# Patient Record
Sex: MALE | Race: WHITE | NOT HISPANIC OR LATINO
[De-identification: names, ages, dates, MRNs, and addresses within clinical notes are randomized per-mention and may not be internally consistent; named-entity substitution may affect disease eponyms.]

---

## 2019-10-16 PROBLEM — Z00.00 ENCOUNTER FOR PREVENTIVE HEALTH EXAMINATION: Status: ACTIVE | Noted: 2019-10-16

## 2019-12-04 ENCOUNTER — APPOINTMENT (OUTPATIENT)
Dept: GASTROENTEROLOGY | Facility: CLINIC | Age: 69
End: 2019-12-04

## 2022-08-16 ENCOUNTER — NON-APPOINTMENT (OUTPATIENT)
Age: 72
End: 2022-08-16

## 2022-08-16 ENCOUNTER — APPOINTMENT (OUTPATIENT)
Dept: DERMATOLOGY | Facility: CLINIC | Age: 72
End: 2022-08-16

## 2022-08-16 DIAGNOSIS — Z12.83 ENCOUNTER FOR SCREENING FOR MALIGNANT NEOPLASM OF SKIN: ICD-10-CM

## 2022-08-16 PROCEDURE — 99203 OFFICE O/P NEW LOW 30 MIN: CPT | Mod: 25

## 2022-08-16 PROCEDURE — 15789 CHEMICAL PEEL FACIAL DERMAL: CPT

## 2022-08-16 NOTE — PHYSICAL EXAM
[FreeTextEntry3] : AAOx3, pleasant, NAD, no visual lymphadenopathy\par hair, scalp, face, nose, eyelids, ears, lips, oropharynx, neck, chest, abdomen, back, right arm, left arm, nails, and hands examined with all normal findings,\par pertinent findings include:\par \par multiple benign nevi and lentigines\par scaling erythematous papules on scalp x18

## 2022-08-16 NOTE — HISTORY OF PRESENT ILLNESS
[FreeTextEntry1] : skin check [de-identified] : 72 year old male here for skin check. hx of numerous AKs and cryo and NMSC.

## 2022-08-16 NOTE — ASSESSMENT
[FreeTextEntry1] : 1) benign findings as above- education\par \par 2) -Consent obtained, \par - No history of HSV. Valtrex unnecessary\par - Area prepped with alcohol\par - Two passes of Jessner's solution with an achieved level 1-2 frost, Pt \par tolerated well\par - Subsequently single pass with TCA 35% with confluent level 2 frost.\par Discrete actinic keratosesx3 with point application of TCA 35% with level 3 \par frost\par - Post peel area cleaned with guaze and cold water.  Vaseline applied to scalp\par - Sun protection emphasized. SPF ordered.  Pt will apply vaseline qdaily to \par area.\par - Post peel care discussed\par \par

## 2023-02-13 ENCOUNTER — APPOINTMENT (OUTPATIENT)
Dept: DERMATOLOGY | Facility: CLINIC | Age: 73
End: 2023-02-13
Payer: MEDICARE

## 2023-02-13 PROCEDURE — 15793 CHEMICAL PEEL NONFACIAL DRM: CPT

## 2023-02-13 PROCEDURE — 99214 OFFICE O/P EST MOD 30 MIN: CPT | Mod: 25

## 2023-02-13 RX ORDER — TRIAMCINOLONE ACETONIDE 1 MG/G
0.1 OINTMENT TOPICAL TWICE DAILY
Qty: 1 | Refills: 1 | Status: ACTIVE | COMMUNITY
Start: 2023-02-13 | End: 1900-01-01

## 2023-02-13 NOTE — HISTORY OF PRESENT ILLNESS
[FreeTextEntry1] : growths [de-identified] : 73 year old male here for skin check. hx of numerous AKs and cryo and NMSC. 2 larger discrete growths on scalp. itching on left upper back.

## 2023-02-13 NOTE — ASSESSMENT
[FreeTextEntry1] : 1) benign findings as above- education\par \par 2) impractical for LN2 to all lesions, unable to tolerate 5Fu or PDT\par \par -Consent obtained, \par - No history of HSV. Valtrex unnecessary\par - Area prepped with alcohol\par - Two passes of Jessner's solution with an achieved level 1-2 frost, Pt \par tolerated well\par - Subsequently single pass with TCA 35% with confluent level 2 frost.\par Discrete actinic keratosesx3 with point application of TCA 35% with level 3 \par frost\par - Post peel area cleaned with guaze and cold water.  Vaseline applied to scalp\par - Sun protection emphasized. SPF ordered.  Pt will apply vaseline qdaily to \par area.\par - Post peel care discussed\par \par Ln2 to 2 discrete areas\par The specified lesions were treated with liquid nitrogen cryotherapy.  Discussed risks including pain, blistering, crusting, discoloration, recurrence.\par \par 3) rash\par TAC BID PRN; SED

## 2023-02-13 NOTE — PHYSICAL EXAM
[FreeTextEntry3] : AAOx3, pleasant, NAD, no visual lymphadenopathy\par hair, scalp, face, nose, eyelids, ears, lips, oropharynx, neck, chest, abdomen, back, right arm, left arm, nails, and hands examined with all normal findings,\par pertinent findings include:\par \par multiple benign nevi and lentigines\par scaling erythematous papules on scalp x16, 2 larger lesions\par red plaque on left upper back

## 2023-08-14 ENCOUNTER — APPOINTMENT (OUTPATIENT)
Dept: DERMATOLOGY | Facility: CLINIC | Age: 73
End: 2023-08-14
Payer: MEDICARE

## 2023-08-14 DIAGNOSIS — D22.9 MELANOCYTIC NEVI, UNSPECIFIED: ICD-10-CM

## 2023-08-14 DIAGNOSIS — L29.9 PRURITUS, UNSPECIFIED: ICD-10-CM

## 2023-08-14 DIAGNOSIS — L81.4 OTHER MELANIN HYPERPIGMENTATION: ICD-10-CM

## 2023-08-14 PROCEDURE — 99213 OFFICE O/P EST LOW 20 MIN: CPT | Mod: 25

## 2023-08-14 PROCEDURE — 15793 CHEMICAL PEEL NONFACIAL DRM: CPT

## 2023-08-14 NOTE — HISTORY OF PRESENT ILLNESS
[FreeTextEntry1] : growths [de-identified] : 73 year old male here for skin check. hx of numerous AKs and cryo and NMSC.

## 2023-08-14 NOTE — ASSESSMENT
[FreeTextEntry1] : 1) benign findings as above- education  2) impractical for LN2 to all lesions, unable to tolerate 5Fu or PDT  -Consent obtained,  - No history of HSV. Valtrex unnecessary - Area prepped with alcohol - Two passes of Jessner's solution with an achieved level 1-2 frost, Pt  tolerated well - Subsequently single pass with TCA 35% with confluent level 2 frost. Discrete actinic keratosesx3 with point application of TCA 35% with level 3  frost - Post peel area cleaned with guaze and cold water.  Vaseline applied to scalp - Sun protection emphasized. SPF ordered.  Pt will apply vaseline qdaily to  area. - Post peel care discussed  Ln2 to 2 discrete areas The specified lesions were treated with liquid nitrogen cryotherapy.  Discussed risks including pain, blistering, crusting, discoloration, recurrence.  3) rash TAC BID PRN; SED

## 2023-08-14 NOTE — PHYSICAL EXAM
[FreeTextEntry3] : AAOx3, pleasant, NAD, no visual lymphadenopathy hair, scalp, face, nose, eyelids, ears, lips, oropharynx, neck, chest, abdomen, back, right arm, left arm, nails, and hands examined with all normal findings, pertinent findings include:  multiple benign nevi and lentigines scaling erythematous papules on scalp x16 red plaque on left upper back

## 2024-02-13 ENCOUNTER — APPOINTMENT (OUTPATIENT)
Dept: DERMATOLOGY | Facility: CLINIC | Age: 74
End: 2024-02-13

## 2024-03-27 ENCOUNTER — APPOINTMENT (OUTPATIENT)
Dept: DERMATOLOGY | Facility: CLINIC | Age: 74
End: 2024-03-27
Payer: MEDICARE

## 2024-03-27 DIAGNOSIS — L57.0 ACTINIC KERATOSIS: ICD-10-CM

## 2024-03-27 PROCEDURE — 99213 OFFICE O/P EST LOW 20 MIN: CPT

## 2024-03-27 NOTE — PHYSICAL EXAM
[Alert] : alert [Oriented x 3] : ~L oriented x 3 [Well Nourished] : well nourished [FreeTextEntry3] : A skin exam was performed including the scalp and face (including lips, ears, nose and eyes), The patient declined full body examination. The exam revealed the following benign growths:  [place] pigmented nevi.  Seborrheic keratoses.  Lentigines.  Cherry angioma.  Keratotic macules/papules c/w AK's on the face and scalp

## 2024-03-27 NOTE — HISTORY OF PRESENT ILLNESS
[FreeTextEntry1] : Spots of concern [de-identified] : Patient presents for skin check. He only wants his face checked today. Hx of NMSC and AKs.

## 2024-03-27 NOTE — ASSESSMENT
[FreeTextEntry1] : Actinic Keratoses: We have discussed the nature and usual course of these lesions. Discussed cryotherapy vs topical chemo cream Patient cannot tolerate topical chemo cream Wants to hold off on cryotherapy because of upcoming holidays  Will schedule f/u for treatment of AKs and FBSE   We discussed the importance of photoprotection, including the use of hats, protective clothing and sunscreens with an SPF of at least 30.  Sun avoidance was also discussed.  The ABCDE's of melanoma were discussed.  Regular skin exams recommended.

## 2024-05-29 ENCOUNTER — APPOINTMENT (OUTPATIENT)
Dept: GASTROENTEROLOGY | Facility: CLINIC | Age: 74
End: 2024-05-29
Payer: MEDICARE

## 2024-05-29 VITALS
HEIGHT: 65 IN | BODY MASS INDEX: 30.82 KG/M2 | SYSTOLIC BLOOD PRESSURE: 122 MMHG | DIASTOLIC BLOOD PRESSURE: 84 MMHG | WEIGHT: 185 LBS

## 2024-05-29 DIAGNOSIS — K62.5 HEMORRHAGE OF ANUS AND RECTUM: ICD-10-CM

## 2024-05-29 DIAGNOSIS — Z12.11 ENCOUNTER FOR SCREENING FOR MALIGNANT NEOPLASM OF COLON: ICD-10-CM

## 2024-05-29 PROCEDURE — 99203 OFFICE O/P NEW LOW 30 MIN: CPT

## 2024-05-29 RX ORDER — SODIUM SULFATE, POTASSIUM SULFATE AND MAGNESIUM SULFATE 1.6; 3.13; 17.5 G/177ML; G/177ML; G/177ML
17.5-3.13-1.6 SOLUTION ORAL
Qty: 1 | Refills: 0 | Status: ACTIVE | COMMUNITY
Start: 2024-05-29 | End: 1900-01-01

## 2024-05-29 NOTE — PHYSICAL EXAM
[Alert] : alert [Healthy Appearing] : healthy appearing [Sclera] : the sclera and conjunctiva were normal [Hearing Threshold Finger Rub Not [place]] : hearing was normal [Normal Appearance] : the appearance of the neck was normal [No Respiratory Distress] : no respiratory distress [Auscultation Breath Sounds / Voice Sounds] : lungs were clear to auscultation bilaterally [Bowel Sounds] : normal bowel sounds [Heart Rate And Rhythm] : heart rate was normal and rhythm regular [Abdomen Tenderness] : non-tender [Abdomen Soft] : soft [Abnormal Walk] : normal gait [Normal Color / Pigmentation] : normal skin color and pigmentation [Oriented To Time, Place, And Person] : oriented to person, place, and time

## 2024-05-29 NOTE — HISTORY OF PRESENT ILLNESS
[FreeTextEntry1] : Keegan Maradiaga is a 74 year old male presents today for consult for colonoscopy. Previous colonoscopy was in 2016, adenomatous polyps were removed at that time. Denies FMH of CRC. Denies bowel complaints, typically has bowel movements regularly without significant straining or overt bleeding such as melena or hematochezia. Denies upper GI symptoms such as GERD, nausea, vomiting, or dysphagia. Denies unintentional weight loss.

## 2024-05-29 NOTE — ADDENDUM
[FreeTextEntry1] : I, Dr. Bedolla, personally performed the evaluation and management (E/M) services for this new patient. That E/M includes conducting the initial examination, assessing all conditions, and establishing the plan of care. Today, my NPP, [insert the name], was here to observe my evaluation and management services for this patient to be followed going forward.

## 2024-05-29 NOTE — ASSESSMENT
[FreeTextEntry1] : Plan: Given history of colonic polyps, would recommend colonoscopy. Risks versus benefits as well as instructions reviewed, pt agrees to planned procedure. All questions answered.

## 2024-08-27 ENCOUNTER — APPOINTMENT (OUTPATIENT)
Dept: GASTROENTEROLOGY | Facility: AMBULATORY MEDICAL SERVICES | Age: 74
End: 2024-08-27
Payer: MEDICARE

## 2024-08-27 PROCEDURE — 45378 DIAGNOSTIC COLONOSCOPY: CPT | Mod: PT

## 2024-09-24 ENCOUNTER — APPOINTMENT (OUTPATIENT)
Dept: DERMATOLOGY | Facility: CLINIC | Age: 74
End: 2024-09-24
Payer: MEDICARE

## 2024-09-24 DIAGNOSIS — B07.9 VIRAL WART, UNSPECIFIED: ICD-10-CM

## 2024-09-24 DIAGNOSIS — L57.0 ACTINIC KERATOSIS: ICD-10-CM

## 2024-09-24 DIAGNOSIS — L21.9 SEBORRHEIC DERMATITIS, UNSPECIFIED: ICD-10-CM

## 2024-09-24 DIAGNOSIS — L29.9 PRURITUS, UNSPECIFIED: ICD-10-CM

## 2024-09-24 PROCEDURE — 99214 OFFICE O/P EST MOD 30 MIN: CPT | Mod: 25

## 2024-09-24 PROCEDURE — 17110 DESTRUCTION B9 LES UP TO 14: CPT

## 2024-09-24 NOTE — ASSESSMENT
[FreeTextEntry1] : rash seb derm HC PRN; SED H&S for maintenance; SED  actinic damage peel or 5FU in future; defers today  VV -treated with cautery at setting of 2.5; SED including burning, scarring, and incomplete tx. patient verbalized understanding

## 2024-09-24 NOTE — PHYSICAL EXAM
[FreeTextEntry3] : actinic damage scalp Scaling patches located on face- brow, NLF, cheeks; perinasal keratotic papule with black dots on surface on left chin

## 2024-09-24 NOTE — HISTORY OF PRESENT ILLNESS
[FreeTextEntry1] : rash on face [de-identified] : 74 year old male. rash on face. actinic damage scalp. spot left chin.

## 2025-03-18 ENCOUNTER — APPOINTMENT (OUTPATIENT)
Dept: DERMATOLOGY | Facility: CLINIC | Age: 75
End: 2025-03-18
Payer: MEDICARE

## 2025-03-18 ENCOUNTER — NON-APPOINTMENT (OUTPATIENT)
Age: 75
End: 2025-03-18

## 2025-03-18 VITALS — WEIGHT: 170 LBS | BODY MASS INDEX: 28.32 KG/M2 | HEIGHT: 65 IN

## 2025-03-18 DIAGNOSIS — L81.4 OTHER MELANIN HYPERPIGMENTATION: ICD-10-CM

## 2025-03-18 DIAGNOSIS — L57.0 ACTINIC KERATOSIS: ICD-10-CM

## 2025-03-18 DIAGNOSIS — D22.9 MELANOCYTIC NEVI, UNSPECIFIED: ICD-10-CM

## 2025-03-18 DIAGNOSIS — L29.9 PRURITUS, UNSPECIFIED: ICD-10-CM

## 2025-03-18 PROCEDURE — 99213 OFFICE O/P EST LOW 20 MIN: CPT | Mod: 25

## 2025-03-18 PROCEDURE — 15793 CHEMICAL PEEL NONFACIAL DRM: CPT
